# Patient Record
(demographics unavailable — no encounter records)

---

## 2017-06-09 NOTE — EMERGENCY ROOM REPORT
History of Present Illness


General


Chief Complaint:  Pain


Source:  Patient





Present Illness


HPI


This is a 50-year-old female who had recent orthoscopic knee surgery to repair 

and left meniscal tear 2 weeks ago.  She was doing well and off of her pain 

medication.  Tonight her knee locked up and she tried to get up and she twisted 

her knee.  Complaining of left knee pain.  Pain is 9/10.  Worse with movement.  

Denies any fall.  Denies any nausea vomiting.  No radiation.


Allergies:  


Coded Allergies:  


     MEPERIDINE (Verified  Allergy, Intermediate, Hives, 2/25/13)


 COPIED FROM UNCODED SECTION


     MORPHINE (Verified  Allergy, ITCHING, 2/25/13)


     METOCLOPRAMIDE (Verified  Adverse Reaction, Intermediate, "MY MUSCLE 

MUSCLE ARE FREEZING UP", 2/25/13)


 COPIED FROM UNCODED SECTION





Patient History


Past Medical History:  see triage record, old chart reviewed


Past Surgical History:  other


Pertinent Family History:  none


Social History:  Denies: smoking


Pregnant Now:  No


Immunizations:  other


Reviewed Nursing Documentation:  PMH: Agreed, PSxH: Agreed





Nursing Documentation-PMH


Hx Cardiac Problems:  No - LEFT KNEE SURGERY


Hx Cancer:  No


Hx Gastrointestinal Problems:  Yes - GASTRIC ULCER,HAD PARTIAL GASTRECTOMY X2


Hx Neurological Problems:  Yes


Hx Cerebrovascular Accident:  No


Hx Transient Ischemic Attacks:  No


Hx Dementia:  No


Hx Alzheimer's Disease:  No


Hx Parkinson's Disease:  No


Hx Meningitis:  No


Hx Encephalitis:  No


Hx Seizures:  Yes - 2010


Hx Epilepsy:  No


Hx Multiple Sclerosis:  No


Hx Cerebral Palsy:  No


Hx Amyotrophic Lat Sclerosis:  No


Hx Guillian-[place] Syndrome:  No


Hx Paralysis:  No


Hx Peripheral Neuropathy:  No


Hx Spinal Cord Injury:  No


Hx Head Trauma:  No


Hx Traumatic Brain Injury:  No


Hx Memory Loss:  No


Hx Concentration Difficulty:  No


Hx Speech Problem:  No


Hx Tremors:  No


Hx Vertigo:  No


Hx Dizziness:  No


Hx Syncope:  No


Hx Headaches:  Yes - POST SUBDURAL HEMATOMA REMOVAL


Hx Aphasia:  No


Hx Dysphasia:  No


Hx Numbness:  No


Hx Weakness:  No


Hx Fatigue:  No


Hx Neurologic Surgery:  Yes - HAD SUBDURAL HEMATOMA REMOVED


Hx Brain Shunt:  No





Review of Systems


Eye:  Denies: blurred vision, eye pain


ENT:  Denies: ear pain, nose congestion, throat swelling


Respiratory:  Denies: cough, shortness of breath


Cardiovascular:  Denies: chest pain, palpitations


Gastrointestinal:  Denies: abdominal pain, diarrhea, nausea, vomiting


Musculoskeletal:  Reports: joint pain, joint swelling, Denies: back pain


Skin:  Denies: rash


Neurological:  Denies: headache, numbness


Endocrine:  Denies: increased thirst, increased urine


Hematologic/Lymphatic:  Denies: easy bruising


All Other Systems:  negative except mentioned in HPI





Physical Exam





Vital Signs








  Date Time  Temp Pulse Resp B/P Pulse Ox O2 Delivery O2 Flow Rate FiO2


 


6/8/17 23:42 97.9 91 16 112/69 95 Room Air  





vitals normal


Sp02 EP Interpretation:  reviewed, normal


General Appearance:  well appearing, no apparent distress, alert


Head:  normocephalic, atraumatic


Eyes:  bilateral eye EOMI, bilateral eye PERRL


ENT:  hearing grossly normal, normal pharynx


Neck:  full range of motion, supple, no meningismus


Respiratory:  chest non-tender, lungs clear, normal breath sounds


Cardiovascular #1:  regular rate, rhythm, no murmur


Gastrointestinal:  normal bowel sounds, non tender, no mass, no organomegaly, 

no bruit, non-distended


Musculoskeletal:  back normal, other - Patient has tenderness along the medial 

meniscus.  Knee is stable.


Psychiatric:  mood/affect normal


Skin:  warm/dry





Medical Decision Making


Diagnostic Impression:  


 Primary Impression:  


 Left knee sprain


 Qualified Codes:  S83.412A - Sprain of medial collateral ligament of left knee

, initial encounter


ER Course


She presents with left knee sprain.  She said she has knee immobilizer and 

crutches at home.  Did not require new one.  No evidence of any bony injuries.  

She may have ligament injury.  She may be an MRI.  This can be done as an 

outpatient.


Other X-Ray Diagnostic Results


Other X-Ray Diagnostic Results :  


   X-Ray Ordered:  X-rays left knee


   Date:  Jun 9, 2017


   Time:  00:23


   EP Interpretation:  Yes


   Findings:  no fractures, no dislocation, no soft tissue swelling


   Number of Views:  3





Last Vital Signs








  Date Time  Temp Pulse Resp B/P Pulse Ox O2 Delivery O2 Flow Rate FiO2


 


6/8/17 23:56 97.9 79 16 112/69 95 Room Air  








Status:  improved


Disposition:  HOME, SELF-CARE


Condition:  Stable


Scripts


Hydrocodone/Acetaminophen 5-325* (HYDROCODONE/ACETAMINOPHEN 5-325*) 1 Each 

Tablet


1 TAB ORAL Q6H Y for For Pain, #20 TAB 0 Refills


   Prov: DREW ESCOBEDO M.D.         6/9/17


Referrals:  


[facility]OS[facility],REFERRING (PCP)





Additional Instructions:  


Followup with the orthopedic Dr. in 3-5 days.  Ice pack to the area.  Use the 

knee immobilizer and crutches.  Return if worse.











DREW ESCOBEDO M.D. Jun 9, 2017 00:24

## 2017-06-09 NOTE — DIAGNOSTIC IMAGING REPORT
Indication: PAIN



Technique: 3 views of the left knee



Comparison: None



Findings:No suprapatellar effusion. No acute fracture. No dislocation. There is a

small superior pole patellar osteophyte.



Impression:No acute process



This agrees with the preliminary interpretation provided by the emergency room

physician

## 2017-07-08 NOTE — EMERGENCY ROOM REPORT
History of Present Illness


General


Chief Complaint:  Upper Extremity Injury


Source:  Patient, Medical Record





Present Illness


HPI


The patient is a 50-year-old female presenting for left elbow and left knee 

pain after falling.  Patient states that she had meniscus repair surgery one 

month prior.  She states that she was climbing stairs, tripped, and fell onto 

the left elbow 3 days prior.  She is now complaining of both he and elbow pain.

  Described as a 10 out of 10 dull ache and does not radiate from these areas.  

Worse with touch. 


She denies any other symptoms including numbness or tingling.


Allergies:  


Coded Allergies:  


     MEPERIDINE (Verified  Allergy, Intermediate, Hives, 13)


 COPIED FROM UNCODED SECTION


     MORPHINE (Verified  Allergy, ITCHING, 13)


     METOCLOPRAMIDE (Verified  Adverse Reaction, Intermediate, "MY MUSCLE 

MUSCLE ARE FREEZING UP", 13)


 COPIED FROM UNCODED SECTION





Patient History


Past Medical History:  see triage record


Pertinent Family History:  none


Last Menstrual Period:  1 year ago


Pregnant Now:  No


:  2


Para:  0


Reviewed Nursing Documentation:  PMH: Agreed, PSxH: Agreed





Nursing Documentation-PMH


Past Medical History:  No History, Except For


Hx Cardiac Problems:  No - LEFT KNEE SURGERY


Hx Cancer:  No


Hx Gastrointestinal Problems:  Yes - GASTRIC ULCER,HAD PARTIAL GASTRECTOMY X2


Hx Neurological Problems:  Yes


Hx Cerebrovascular Accident:  No


Hx Transient Ischemic Attacks:  No


Hx Dementia:  No


Hx Alzheimer's Disease:  No


Hx Parkinson's Disease:  No


Hx Meningitis:  No


Hx Encephalitis:  No


Hx Seizures:  Yes - 


Hx Epilepsy:  No


Hx Multiple Sclerosis:  No


Hx Cerebral Palsy:  No


Hx Amyotrophic Lat Sclerosis:  No


Hx Guillian-[place] Syndrome:  No


Hx Paralysis:  No


Hx Peripheral Neuropathy:  No


Hx Spinal Cord Injury:  No


Hx Head Trauma:  No


Hx Traumatic Brain Injury:  No


Hx Memory Loss:  No


Hx Concentration Difficulty:  No


Hx Speech Problem:  No


Hx Tremors:  No


Hx Vertigo:  No


Hx Dizziness:  No


Hx Syncope:  No


Hx Headaches:  Yes - POST SUBDURAL HEMATOMA REMOVAL


Hx Aphasia:  No


Hx Dysphasia:  No


Hx Numbness:  No


Hx Weakness:  No


Hx Fatigue:  No


Hx Neurologic Surgery:  Yes - HAD SUBDURAL HEMATOMA REMOVED


Hx Brain Shunt:  No





Review of Systems


All Other Systems:  negative except mentioned in HPI





Physical Exam





Vital Signs








  Date Time  Temp Pulse Resp B/P Pulse Ox O2 Delivery O2 Flow Rate FiO2


 


17 14:06 98.2 80 18 109/75 96 Room Air  








Sp02 EP Interpretation:  reviewed, normal


General Appearance:  no apparent distress, alert, GCS 15, non-toxic


Head:  normocephalic, atraumatic


Eyes:  bilateral eye PERRL, bilateral eye normal inspection


ENT:  hearing grossly normal, normal pharynx, no angioedema, normal voice


Musculoskeletal:  back normal, gait/station normal, normal range of motion, 

tender - L olecranon, L anterior knee


Neurologic:  alert, oriented x3, responsive, motor strength/tone normal, 

sensory intact, speech normal


Psychiatric:  judgement/insight normal, memory normal, mood/affect normal, no 

suicidal/homicidal ideation


Skin:  normal color, no rash, warm/dry, well hydrated





Procedures


Splinting


Splinting :  


   Consent:  Verbal


   Location:  L arm


   Pre-Made Type:  sling


   Pre-Proc Neuro Vasc Exam:  normal


   Post-Proc Neuro Vasc Exam:  normal


   Patient Tolerated:  Well


   Complications:  None





Medical Decision Making


PA Attestation


Dr. Murillo is my supervising physician. Patient management was discussed 

with my supervising physician


Diagnostic Impression:  


 Primary Impression:  


 Left knee sprain


 Qualified Codes:  S83.92XA - Sprain of unspecified site of left knee, initial 

encounter


 Additional Impression:  


 Elbow contusion


 Qualified Codes:  S50.02XA - Contusion of left elbow, initial encounter


ER Course


The patient is a 50-year-old female presenting for left elbow and left knee 

pain after falling





Ddx considered include but not limited to sprain/strain, fracture, contusion





PE: NAD


TTP over the L olecranon and  L anterior knee without deformity. No ecchymosis. 

No edema. FulL AROM





X-ray of both areas are unremarkable.





Left arm sling is placed





She'll be discharged home with a prescription for pain medication and needs to 

followup with orthopedics as she had recent surgery on the left knee.





ER precautions given


Other X-Ray Diagnostic Results


Other X-Ray Diagnostic Results #1:  


   X-Ray ordered:  L knee


   Indication:  Pain


   EP Interpretation:  Yes


   Interpretation:  no dislocation, no soft tissue swelling, no fractures


   Impression:  No acute disease


   Interpreting ER Provider:  Dr. Murillo


   PA Scribe Text


I am acting as scribe for my supervising physician. My supervising physician's 

interpretation of the L knee xrays are there are no fractures, dislocations or 

soft tissue swelling.


Other X-Ray Diagnostic Results #2:  


   X-Ray ordered:  L elbow


   # of Views/Limited Vs Complete:  3 View


   Indication:  Pain


   EP Interpretation:  Yes


   Interpretation:  no dislocation, no soft tissue swelling, no fractures


   Impression:  No acute disease


   Interpreting ER Provider:  Dr. Lidia RENTERIA Scribe Text


I am acting as scribe for my supervising physician. My supervising physician's 

interpretation of the L knee xrays are there are no fractures, dislocations or 

soft tissue swelling.





Last Vital Signs








  Date Time  Temp Pulse Resp B/P Pulse Ox O2 Delivery O2 Flow Rate FiO2


 


17 15:09 98.2  18 109/75 96 Room Air  


 


17 14:06  80      








Status:  improved


Disposition:  HOME, SELF-CARE


Condition:  Improved


Scripts


Hydrocodone Bit/Acetaminophen 5-325* (NORCO 5-325 TABLET*) 1 Each Tablet


1 TAB ORAL Q6HR Y for For Pain, #10 TAB


   Prov: NADEEN LOREDO P.A.         17 


Ibuprofen* (MOTRIN*) 600 Mg Tablet


600 MG ORAL Q8H Y for For Pain, #30 TAB 0 Refills


   Prov: NADEEN LOREDO P.A.         17


Patient Instructions:  Elbow Contusion, Knee Sprain





Additional Instructions:  


I discussed my findings with the patient. All questions and concerns have been 

answered. Treatment and medication compliance have been addressed. I advised 

the patient that they need to follow up with PMD in 3-5 days. Return to ED if 

pain remains or worsens, numbness or tingling occurs, new rash is noticed, 

fever is noticed, or if needed for any reason. Patient verbalized understanding 

of discharge instructions.





The patient is to follow up with orthopedic surgeon for reevaluation of the 

left knee











NADEEN LOREDO 2017 22:06

## 2017-07-09 NOTE — DIAGNOSTIC IMAGING REPORT
Indications:  Fall, left elbow injury and pain



Technique: 3 views left elbow.



Findings:



Comparison: None



No fracture, dislocation, joint space     widening or effusion , surrounding soft

tissue swelling/foreign body/gas, or other acute changes are identified.



IMPRESSION:



No evidence of acute injury to the left elbow.

## 2017-07-10 NOTE — DIAGNOSTIC IMAGING REPORT
Indications:  Fall, left knee injury and pain



Technique: 3 views left knee.



Findings:



Comparison: None



No fracture, dislocation, joint space     widening or effusion , surrounding soft

tissue swelling/foreign body/gas, or other acute changes are identified. Small 

spur

emanates from the superior pole of the patella in region of quadriceps tendon

insertion.



IMPRESSION:



No evidence of acute injury to the left knee



Patellar enthesophyte.

## 2017-07-17 NOTE — DIAGNOSTIC IMAGING REPORT
Indication: Abdominal pain



Technique: Continuous helical transaxial imaging of the abdomen and pelvis was

obtained from the lung bases to the pubic symphysis during intravenous 

contrast

administration. Coronal 2-D reformats were also obtained. Study obtained in a

Siemens sensation 64 slice CT.



Total Dose length Product (DLP):  692 mGycm



CT Dose Index Volume (CTDIvol):   15 mGy



Comparison: None



Findings: There is mild posterior basilar atelectasis. Cholecystectomy noted. No

biliary ductal prominence is demonstrated. Please correlate clinically. Sutures in

the stomach are noted, likely stigmata of gastric bypass. No free fluid or free air

or evidence of bowel obstruction. No hydronephrosis demonstrated. Mild 

arterial

calcifications are present. The uterus is retroverted. The appendix is definitely

seen.



Impression:



Biliary ductal prominence in the context of previous cholecystectomy. Please

correlate clinically.



Status post gastric bypass.



Retroverted uterus



[facility] has communicated the preliminary results to the 

Emergency

Department.  Their findings are largely concordant with this report.







The CT scanner at [facility] is accredited by the [employer] College 

of

Radiology and the scans are performed using dose optimization techniques as

appropriate to a performed exam including Automatic Exposure control.

## 2017-07-17 NOTE — EMERGENCY ROOM REPORT
History of Present Illness


General


Chief Complaint:  Abdominal Pain


Source:  Patient





Present Illness


HPI


50-year-old female presents ED complaining of abdominal pain and vomiting.  

Patient states that she's been vomiting blood.  Notes history of gastric 

ulcers.  States she was recently admitted to [facility] for treatment of the 

ulcer and had it "clipped".  She states she was discharged a few days ago.  

Patient notes having multiple episodes of vomiting blood.  Pain is epigastric, 

sharp, 8/10, nonradiating.  Denies chest pain or shortness of breath.  Denies 

taking blood thinners.  No other aggravating relieving factors.  Denies any 

other associated symptoms


Allergies:  


Coded Allergies:  


     MEPERIDINE (Verified  Allergy, Intermediate, Hives, 2/25/13)


 COPIED FROM UNCODED SECTION


     PANTOPRAZOLE (Verified  Allergy, Intermediate, 7/16/17)


     MORPHINE (Verified  Allergy, Unknown, ITCHING, 7/16/17)


     METOCLOPRAMIDE (Verified  Adverse Reaction, Intermediate, "MY MUSCLE 

MUSCLE ARE FREEZING UP", 2/25/13)


 COPIED FROM UNCODED SECTION





Patient History


Past Medical History:  ulcer


Past Surgical History:  other - gastric bypass


Pertinent Family History:  none


Social History:  Denies: alcohol use, drug use, smoking


Pregnant Now:  No


Immunizations:  UTD


Reviewed Nursing Documentation:  PMH: Agreed, PSxH: Agreed





Nursing Documentation-PMH


Hx Cardiac Problems:  No


Hx Hypertension:  No


Hx Asthma:  No


Hx COPD:  No


Hx Diabetes:  No


Hx Cancer:  No


Hx Gastrointestinal Problems:  Yes - ulcer


Hx Dialysis:  No


History Of Psychiatric Problem:  No


Hx Neurological Problems:  No


Hx Cerebrovascular Accident:  No


Hx Transient Ischemic Attacks:  No


Hx Dementia:  No


Hx Alzheimer's Disease:  No


Hx Parkinson's Disease:  No


Hx Meningitis:  No


Hx Encephalitis:  No


Hx Seizures:  No


Hx Epilepsy:  No


Hx Multiple Sclerosis:  No


Hx Cerebral Palsy:  No


Hx Amyotrophic Lat Sclerosis:  No


Hx Guillian-[place] Syndrome:  No


Hx Paralysis:  No


Hx Peripheral Neuropathy:  No


Hx Spinal Cord Injury:  No


Hx Head Trauma:  No


Hx Traumatic Brain Injury:  No


Hx Memory Loss:  No


Hx Concentration Difficulty:  No


Hx Speech Problem:  No


Hx Tremors:  No


Hx Vertigo:  No


Hx Dizziness:  No


Hx Syncope:  No


Hx Headaches:  Yes - POST SUBDURAL HEMATOMA REMOVAL


Hx Aphasia:  No


Hx Dysphasia:  No


Hx Numbness:  No


Hx Weakness:  No


Hx Fatigue:  No


Hx Neurologic Surgery:  Yes - HAD SUBDURAL HEMATOMA REMOVED


Hx Brain Shunt:  No





Review of Systems


All Other Systems:  negative except mentioned in HPI





Physical Exam





Vital Signs








  Date Time  Temp Pulse Resp B/P Pulse Ox O2 Delivery O2 Flow Rate FiO2


 


7/16/17 21:44 98.2 68 16 108/60 98 Room Air  


 


7/16/17 22:36        98








Sp02 EP Interpretation:  reviewed, normal


General Appearance:  alert, GCS 15, non-toxic, mild distress, thin


Head:  normocephalic, atraumatic


Eyes:  bilateral eye PERRL, bilateral eye normal inspection


ENT:  hearing grossly normal, normal pharynx, no angioedema, normal voice


Neck:  full range of motion, supple/symm/no masses


Respiratory:  chest non-tender, lungs clear, normal breath sounds, speaking 

full sentences


Cardiovascular #1:  regular rate, rhythm, no edema


Cardiovascular #2:  2+ carotid (R), 2+ carotid (L), 2+ radial (R), 2+ radial (L)

, 2+ dorsalis pedis (R), 2+ dorsalis pedis (L)


Gastrointestinal:  normal bowel sounds, soft, non-distended, no guarding, no 

rebound, tenderness - epigastric


Rectal:  deferred


Genitourinary:  normal inspection, no CVA tenderness


Musculoskeletal:  back normal, gait/station normal, normal range of motion, non-

tender


Neurologic:  alert, oriented x3, responsive, motor strength/tone normal, 

sensory intact, speech normal


Psychiatric:  judgement/insight normal, memory normal, mood/affect normal, no 

suicidal/homicidal ideation


Reflexes:  3+ bicep (R), 3+ bicep (L), 3+ tricep (R), 3+ tricep (L), 3+ knee (R)

, 3+ knee (L)


Skin:  normal color, no rash, warm/dry, well hydrated


Lymphatic:  no adenopathy





Medical Decision Making


Diagnostic Impression:  


 Primary Impression:  


 UGIB (upper gastrointestinal bleed)


ER Course


Hospital Course 


50-year-old female presents ED complaining of vomiting blood.  History of 

ulcer.  Recently discharged from [facility]





Differential diagnoses include: UGIB, LGIB, hemorrhoids 





Clinical course


Patient placed on stretcher.  Cardiac monitor.  After initial history and 

physical I ordered labs, IV fluids, zofran, pepcid, pain meds, CT





Labs - no leukocytosis, Hb/Hct stable. electrolytes ok. trop negative


CT shows status post cholecystectomy, status post gastric bypass but no acute 

changes otherwise





She continues to have pain and is vomiting blood.  Started on octreotide





Case discussed with Dr. Crump and he agreed to accept the patient to his 

service for further care and support 





I feel this is a highly complex case requiring extensive working including EKG/

Rhythm strip, Xray/CT/US, Blood/urine lab work, repeat exams while in ED, and 

administration of strong opiates/narcotics for pain control, admission to 

hospital or close patient follow up.  





Diagnosis - UGIB





Patient admitted to [facility] in serious condition





Labs








Test


  7/16/17


22:20 7/16/17


23:40


 


White Blood Count


  6.5 K/UL


(4.8-10.8) 


 


 


Red Blood Count


  3.53 M/UL


(4.20-5.40) 


 


 


Hemoglobin


  11.2 G/DL


(12.0-16.0) 


 


 


Hematocrit


  32.9 %


(37.0-47.0) 


 


 


Mean Corpuscular Volume 93 FL (80-99)  


 


Mean Corpuscular Hemoglobin


  31.8 PG


(27.0-31.0) 


 


 


Mean Corpuscular Hemoglobin


Concent 34.2 G/DL


(32.0-36.0) 


 


 


Red Cell Distribution Width


  14.2 %


(11.6-14.8) 


 


 


Platelet Count


  149 K/UL


(150-450) 


 


 


Mean Platelet Volume


  8.8 FL


(6.5-10.1) 


 


 


Neutrophils (%) (Auto)


  70.8 %


(45.0-75.0) 


 


 


Lymphocytes (%) (Auto)


  21.4 %


(20.0-45.0) 


 


 


Monocytes (%) (Auto)


  5.4 %


(1.0-10.0) 


 


 


Eosinophils (%) (Auto)


  1.3 %


(0.0-3.0) 


 


 


Basophils (%) (Auto)


  1.1 %


(0.0-2.0) 


 


 


Prothrombin Time


  10.1 SEC


(9.30-11.50) 


 


 


Prothromb Time International


Ratio 1.0 (0.9-1.1) 


  


 


 


Activated Partial


Thromboplast Time 21 SEC (23-33) 


  


 


 


Sodium Level


  143 mEQ/L


(135-145) 


 


 


Potassium Level


  3.6 mEQ/L


(3.4-4.9) 


 


 


Chloride Level


  107 mEQ/L


() 


 


 


Carbon Dioxide Level


  24 mEQ/L


(20-30) 


 


 


Anion Gap 12 (5-15)  


 


Blood Urea Nitrogen


  18 mg/dL


(7-23) 


 


 


Creatinine


  0.6 mg/dL


(0.5-0.9) 


 


 


Estimat Glomerular Filtration


Rate > 60 mL/min


(>60) 


 


 


Glucose Level


  96 mg/dL


() 


 


 


Calcium Level


  9.1 mg/dL


(8.6-10.2) 


 


 


Total Bilirubin


  < 0.2 mg/dL


(0.0-1.2) 


 


 


Aspartate Amino Transf


(AST/SGOT) 17 U/L (5-40) 


  


 


 


Alanine Aminotransferase


(ALT/SGPT) 16 U/L (3-33) 


  


 


 


Alkaline Phosphatase


  62 U/L


() 


 


 


Troponin I


  < 0.30 ng/mL


(<=0.30) 


 


 


Total Protein


  6.1 g/dL


(6.6-8.7) 


 


 


Albumin


  3.9 g/dL


(3.5-5.2) 


 


 


Globulin 2.2 g/dL  


 


Albumin/Globulin Ratio 1.7 (1.0-2.7)  


 


Lipase 25 U/L (< 60)  


 


Urine Color  Pale yellow 


 


Urine Appearance  Clear 


 


Urine pH  7 (4.5-8.0) 


 


Urine Specific Gravity


  


  1.010


(1.005-1.035)


 


Urine Protein


  


  Negative


(NEGATIVE)


 


Urine Glucose (UA)


  


  Negative


(NEGATIVE)


 


Urine Ketones  2+ (NEGATIVE) 


 


Urine Occult Blood


  


  Negative


(NEGATIVE)


 


Urine Nitrite


  


  Negative


(NEGATIVE)


 


Urine Bilirubin


  


  Negative


(NEGATIVE)


 


Urine Urobilinogen


  


  Normal MG/DL


(0.0-1.0)


 


Urine Leukocyte Esterase


  


  Negative


(NEGATIVE)








CT/MRI/US Diagnostic Results


CT/MRI/US Diagnostic Results :  


   Imaging Test Ordered:  CT A/P


   Impression


s/p cholecystectomy. s/p gastric bypass. no acute process identifed





Last Vital Signs








  Date Time  Temp Pulse Resp B/P Pulse Ox O2 Delivery O2 Flow Rate FiO2


 


7/16/17 22:36 98.0 78 16 135/76 98 Room Air  98








Status:  improved


Disposition:  ADMITTED AS INPATIENT


Condition:  Serious


Referrals:  


[facility],REFERRING (PCP)











BELTRAN BERNAL M.D. Jul 17, 2017 01:30

## 2017-07-18 NOTE — 48 HOUR POST ANESTHESIA EVAL
Post Anesthesia Evaluation


Procedure:  EGD


Date of Evaluation:  Jul 18, 2017


Time of Evaluation:  19:43


Blood Pressure Systolic:  114


0:  70


Pulse Rate:  56


Respiratory Rate:  20


Temperature (Fahrenheit):  98.5


O2 Sat by Pulse Oximetry:  98


Airway:  patent


Nausea:  No


Vomiting:  No


Pain Intensity:  0


Hydration Status:  adequate


Cardiopulmonary Status:


at baseline


Mental Status/LOC:  patient returned to baseline


Post-Anesthesia Complications:


0


Follow-up care needed:  N/A - further care as per primary team











LESVIA LAY M.D. Jul 18, 2017 13:18

## 2017-07-18 NOTE — HISTORY AND PHYSICAL REPORT
DATE OF ADMISSION:  07/17/2017



CHIEF COMPLAINT:  Abdominal pain.



HISTORY OF PRESENT ILLNESS:  This is a 50-year-old female who was

just discharged from [facility] two days ago after

admission for GI bleed.  The patient was admitted there to the [facility]

service.  The patient underwent there full workup.  She was seen there by

Dr. Valdivia, GI consult.  She underwent EGD with active oozing seen in the

jejunum.  This was a hemoclips.  Her hemoglobin remained stable post

procedure with no further episodes of hematemesis.  She had a trial of

Protonix.  The patient developed "body spasms."  This resolved with

Benadryl.  The patient was intolerant to PPIs and was started on Zantac

b.i.d.  She was continued on Carafate.  The patient's symptoms were

controlled with p.o. Langley.  The patient demanded to go home and was

discharged.  I tried to get the patient to my office, but the phone was

disconnected.  The patient was referred interestingly in this emergency

room yesterday.



PAST MEDICAL HISTORY:

1. Multiple admissions for gastrointestinal bleed.

2. Status post partial gastrectomy at [facility] in 2007.

3. Anastomotic AVMs.

4. Status post APC three months ago.

5. Degenerative joint disease.

6. Status post cholecystectomy.

7. Status post gastric bypass.

8. History of extrahepatic biliary ductal dilatation.

9. Decompressed rectosigmoid.

10. History of head injury.

11. History of anemia.

12. History of bleeding disorder.

13. History of subdural hematoma in 2010.

14. Status post appendectomy.

15. History of GI Argon plasma coagulation.

16. Status post hysterectomy.



MEDICATIONS:  Pre-admission, Norco p.r.n., Zofran p.r.n., Protonix,

and Carafate.



ALLERGIES:  Meperidine, morphine causing rash, and Reglan.



SOCIAL HISTORY:  She smokes one pack a day of cigarettes.



FAMILY HISTORY:  Unremarkable.



REVIEW OF SYSTEMS:  HEENT:  Normal.    Endocrine:  No history of

diabetes, thyroid, or adrenal problems.  Respiratory:  She denies

shortness of breath, cough, or hemoptysis.  Cardiovascular:  She denies

chest pain or palpitations.  Gastrointestinal:  No history of

hematochezia, melena, hematemesis, diarrhea, or constipation.

Genitourinary:  She denies dysuria, frequency, or hematuria.  Neurologic:

No history of stroke, syncope, or Parkinson disease.



PHYSICAL EXAMINATION:

GENERAL:  This is a middle-aged female, who is in no acute

distress.

VITAL SIGNS:  Blood pressure 100/60, pulse 60 and regular,

respiratory rate 20, and temperature 98 degrees.

HEENT:  The head is normocephalic and atraumatic.  Pupils are equal,

round, and reactive to light and accommodation consensually.

NECK:  Supple.  Trachea midline.  There was no lymphadenopathy or

thyromegaly.

LUNGS:  Clear to auscultation and percussion.

HEART:  Regular rate and rhythm without rubs, murmurs, or gallops.

ABDOMEN:  Soft and nontender.  Bowel sounds are active.

EXTREMITIES:  No clubbing, cyanosis, or edema.

NEUROLOGIC:  She is alert and oriented x4.  Cranial nerves II through

XII are intact.



LABORATORY AND ANCILLARY DATA:  CBC and BMP within normal limits.

Imaging reports, CT scan of abdomen shows biliary ductal prominence,

status post gastric bypass and retroverted uterus.



ASSESSMENT:

1. Nonspecific abdominal pain.  Plan is GI consult.

2. Multiple admissions for gastrointestinal bleed.

3. Status post partial gastrectomy at [facility] in 2007.

4. Anastomotic arteriovenous malformations.

5. Status post intermittent pneumatic compression three months ago.

6. Degenerative joint disease.

7. Status post cholecystectomy.

8. Status post gastric bypass.

9. History of extra hepatic biliary ductal dilatation.

10. Decompressed rectosigmoid.

11. History of head injury.

12. History of anemia.

13. History of bleeding disorder.

14. History of subdural hematoma in 2010.

15. Status post appendectomy.

16. History of GI Argon plasma coagulation.

17. Status post hysterectomy.



PLAN:

1. Symptomatic treatment.

2. GI to advice.









  ______________________________________________

  Delisa Holt M.D.





DR:  LUIS ANGEL

D:  07/17/2017 16:59

T:  07/18/2017 03:12

JOB#:  [id number]

CC:

## 2017-07-18 NOTE — ENDOSCOPY PROCEDURE NOTE
Endoscopy Procedure Note


Indication for Procedure:  gib


Procedures Performed:  EGD


Operative Findings/Diagnosis:  gastric polyp


Specimen:  yes


Pt Tolerated Procedure Well:  Yes


Estimated Blood Loss:  none


Anesthesiologist:  [place]


Anesthesia:  MAC


Implant(s) used?:  No


50 yrs or older w/o bx or poly:  Not Applicable


10yrs. F/U not recommended:  Not Applicable











MITCH NICKERSON Jul 18, 2017 12:30

## 2017-07-18 NOTE — PROCEDURE NOTE
DATE OF PROCEDURE:  07/18/2017



SURGEON:  Álvaro Marmolejo M.D.



PROCEDURE:  Upper endoscopy with biopsy.



ANESTHESIOLOGIST:  Lilli Chinchilla M.D.



INSTRUMENT:  Olympus adult flexible upper endoscope.



INDICATION:  Upper gastrointestinal bleeding.



REASON FOR PROCEDURE:  The procedure, risks, benefits, and possible

consequences, including hemorrhage, aspiration, perforation and infection,

and alternative treatments, were explained to the patient/legal guardian

by Dr. Álvaro Marmolejo and the patient/legal guardian understood and

accepted these risks.



PROCEDURE:  After informed consent was obtained and the patient was

adequately sedated, Olympus upper endoscope was advanced from the mouth,

into the esophagus, into the stomach and the patient had partial

antrectomy.  Then, scope was advanced to the jejunum.



There is no active upper gastrointestinal bleeding at this time.  There

was a small polyp in the gastric body, which was biopsied.  The patient

had evidence of small hiatal hernia.  We advanced the scope into the

jejunum where the prior clip was seen.  Clip was still holding.  There was

no active bleeding at this time.  No obvious ulceration.  No obvious

visible vessel.  The patient tolerated the procedure well without any

complication.



SUMMARY OF FINDINGS:

1. Small hiatal hernia.

2. Gastric polyps, status post biopsy.

3. History of partial antrectomy.

4. Clip seen in the jejunum without any active bleeding at this time.



RECOMMENDATIONS:  The patient had a stable hemoglobin and hematocrit

since admission, most probably the blood was seen most probably from prior

bleeding.  Plan to resume diet and monitor labs.  Follow up biopsy

results.  Possible discharge if the patient is stable.



I want to thank, Dr. Holt, for this kind referral.









  ______________________________________________

  Álvaro Marmolejo M.D.





DR:  MAUREEN

D:  07/18/2017 12:28

T:  07/18/2017 17:41

JOB#:  [id number]

CC:  Delias Holt M.D.; Fax#:  [phone]

## 2017-07-18 NOTE — IMMEDIATE POST-OP EVALUATION
Immediate Post-Op Evalulation


Immediate Post-Op Evalulation


Procedure:  EGD


Date of Evaluation:  Jul 18, 2017


IV Fluids:  300


Blood Products:  0


Estimated Blood Loss:  0


Urinary Output:  0


Blood Pressure Systolic:  110


Blood Pressure Diastolic:  79


Pulse Rate:  71


Respiratory Rate:  16


O2 Sat by Pulse Oximetry:  100


Temperature (Fahrenheit):  97.1


Pain Score (1-10):  0


Nausea:  No


Vomiting:  No


Complications


0


Patient Status:  awake, patent, none


Hydration Status:  adequate


Drug:  N//A











LESVIA LAY M.D. Jul 18, 2017 13:17

## 2017-07-18 NOTE — GENERAL PROGRESS NOTE
Subjective


Allergies:  


Coded Allergies:  


     MEPERIDINE (Verified  Allergy, Intermediate, Hives, 2/25/13)


 COPIED FROM UNCODED SECTION


     PANTOPRAZOLE (Verified  Allergy, Intermediate, 7/16/17)


     MORPHINE (Verified  Allergy, Unknown, ITCHING, 7/16/17)


     METOCLOPRAMIDE (Verified  Adverse Reaction, Intermediate, "MY MUSCLE 

MUSCLE ARE FREEZING UP", 2/25/13)


 COPIED FROM UNCODED SECTION


Subjective


Patient went for EGD. To review results .





Objective





Last 24 Hour Vital Signs








  Date Time  Temp Pulse Resp B/P Pulse Ox O2 Delivery O2 Flow Rate FiO2


 


7/18/17 11:45 97.9 86 19 122/74 96 Room Air  


 


7/18/17 11:36 97.9       


 


7/18/17 08:00  50      


 


7/18/17 08:00 97.9 57 20 118/75 96 Room Air  


 


7/18/17 04:38  50      


 


7/18/17 04:22 97.7 58 20 114/73 97 Room Air  





  58      


 


7/18/17 04:00  58      


 


7/18/17 00:00  58      


 


7/17/17 23:33 97.7 56 20 124/80 99 Room Air  





  56      


 


7/17/17 20:12 98.0 62 20 140/88 98 Room Air  





  62      


 


7/17/17 20:00  59      


 


7/17/17 16:00 98.2 72 18 110/73 97 Room Air  


 


7/17/17 15:51  58      

















Intake and Output  


 


 7/17/17 7/18/17





 19:00 07:00


 


Intake Total 350 ml 952.5 ml


 


Output Total  1 ml


 


Balance 350 ml 951.5 ml


 


  


 


Intake Oral  365 ml


 


IV Total 350 ml 587.5 ml


 


Emesis  1 ml


 


# Voids 2 








Laboratory Tests


7/17/17 17:45: 


White Blood Count 4.3L, Red Blood Count 3.63L, Hemoglobin 11.1L, Hematocrit 

33.8L, Mean Corpuscular Volume 93, Mean Corpuscular Hemoglobin 30.5, Mean 

Corpuscular Hemoglobin Concent 32.8, Red Cell Distribution Width 14.5, Platelet 

Count 135L, Mean Platelet Volume 8.3, Neutrophils (%) (Auto) 44.4L, Lymphocytes 

(%) (Auto) 43.4, Monocytes (%) (Auto) 6.2, Eosinophils (%) (Auto) 4.4H, 

Basophils (%) (Auto) 1.7


7/18/17 01:40: 


White Blood Count 4.0L, Red Blood Count 3.63L, Hemoglobin 11.2L, Hematocrit 

34.6L, Mean Corpuscular Volume 95, Mean Corpuscular Hemoglobin 31.0, Mean 

Corpuscular Hemoglobin Concent 32.5, Red Cell Distribution Width 14.4, Platelet 

Count 144L, Mean Platelet Volume 7.7, Neutrophils (%) (Auto) 40.9L, Lymphocytes 

(%) (Auto) 45.3H, Monocytes (%) (Auto) 7.1, Eosinophils (%) (Auto) 5.2H, 

Basophils (%) (Auto) 1.5


7/18/17 06:20: 


White Blood Count 3.8L, Red Blood Count 3.78L, Hemoglobin 11.4L, Hematocrit 

36.0L, Mean Corpuscular Volume 95, Mean Corpuscular Hemoglobin 30.1, Mean 

Corpuscular Hemoglobin Concent 31.6L, Red Cell Distribution Width 14.5, 

Platelet Count 137L, Mean Platelet Volume 8.5, Neutrophils (%) (Auto) 39.8L, 

Lymphocytes (%) (Auto) 46.4H, Monocytes (%) (Auto) 7.6, Eosinophils (%) (Auto) 

4.9H, Basophils (%) (Auto) 1.3, Prothrombin Time 10.7, Prothromb Time 

International Ratio 1.0, Activated Partial Thromboplast Time 26, Sodium Level 

140, Potassium Level 3.9, Chloride Level 104, Carbon Dioxide Level 26, Anion 

Gap 10, Blood Urea Nitrogen 6L, Creatinine 0.5, Estimat Glomerular Filtration 

Rate > 60, Glucose Level 85, Calcium Level 8.5L, Total Bilirubin 0.3, Aspartate 

Amino Transf (AST/SGOT) 27, Alanine Aminotransferase (ALT/SGPT) 20, Alkaline 

Phosphatase 51, Total Protein 5.5L, Albumin 3.5, Globulin 2.0, Albumin/Globulin 

Ratio 1.7


Height (Feet):  5


Height (Inches):  9.00


Weight (Pounds):  130











LOBO BHATIA Jul 18, 2017 12:50

## 2017-07-18 NOTE — CARDIOLOGY REPORT
--------------- APPROVED REPORT --------------





EKG Measurement

Heart [id number]

KS [id number]

[id number]

[id number]

[id number]





Sinus bradycardia

Possible Left atrial enlargement

Septal infarct, age undetermined

Abnormal ECG

## 2017-07-18 NOTE — ANETHESIA PREOPERATIVE EVAL
Anesthesia Pre-op PMH/ROS


General


Date of Evaluation:  Jul 18, 2017


Anesthesiologist:  [place]


ASA Score:  ASA 3


Mallampati Score


Class I : Soft palate, uvula, fauces, pillars visible


Class II: Soft palate, uvula, fauces visible


Class III: Soft palate, base of uvula visible


Class IV: Only hard plate visible


Mallampati Classification:  Class II


Surgeon:  Jaleel


Diagnosis:  GI bleed


Surgical Procedure:  EGD


Anesthesia History:  none


Family History:  no anesthesia problems


Allergies:  


Coded Allergies:  


     MEPERIDINE (Verified  Allergy, Intermediate, Hives, 2/25/13)


 COPIED FROM UNCODED SECTION


     PANTOPRAZOLE (Verified  Allergy, Intermediate, 7/16/17)


     MORPHINE (Verified  Allergy, Unknown, ITCHING, 7/16/17)


     METOCLOPRAMIDE (Verified  Adverse Reaction, Intermediate, "MY MUSCLE 

MUSCLE ARE FREEZING UP", 2/25/13)


 COPIED FROM UNCODED SECTION


Medications:  see eMAR





Past Medical History


Cardiovascular:  Denies: CAD, HTN, MI, arrhythmia, other, valve dz


Pulmonary:  Denies: COPD, KARAN, asthma, other


Gastrointestinal/Genitourinary:  Reports: GERD, 


   Denies: CRI, ESRD, other


Neurologic/Psychiatric:  Reports: other - h/o subdural hematoma, 


   Denies: CVA, TIA, dementia, depression/anxiety


Endocrine:  Denies: DM, hypothyroidism, other, steroids


HEENT:  Denies: [place] (L), [place] (R), cataract (L), cataract (R), glaucoma, other


Hematology/Immune:  Reports: anemia - acute on chronic, 


   Denies: DVT, bleeding disorder, other


Musculoskeletal/Integumentary:  Denies: DDD, DJD, OA, RA, edema, other


PSxH Narrative:


partial gastrectomy





Anesthesia Pre-op Phys. Exam


Physician Exam





Last Vital Signs








  Date Time  Temp Pulse Resp B/P Pulse Ox O2 Delivery O2 Flow Rate FiO2


 


7/18/17 12:50 97.9 59 18 110/75 98 Room Air  


 


7/18/17 12:37       3.0 


 


7/17/17 03:20        98








Constitutional:  NAD


Cardiovascular:  RRR


Respiratory:  CTA





Airway Exam


Mallampati Score:  Class II


MO:  full


ROM:  full


Teeth:  intact





Anesthesia Pre-op A/P


Labs





Hematology








Test


  7/17/17


17:45 7/18/17


01:40 7/18/17


06:20


 


White Blood Count


  4.3 K/UL


(4.8-10.8)  L 4.0 K/UL


(4.8-10.8)  L 3.8 K/UL


(4.8-10.8)  L


 


Red Blood Count


  3.63 M/UL


(4.20-5.40)  L 3.63 M/UL


(4.20-5.40)  L 3.78 M/UL


(4.20-5.40)  L


 


Hemoglobin


  11.1 G/DL


(12.0-16.0)  L 11.2 G/DL


(12.0-16.0)  L 11.4 G/DL


(12.0-16.0)  L


 


Hematocrit


  33.8 %


(37.0-47.0)  L 34.6 %


(37.0-47.0)  L 36.0 %


(37.0-47.0)  L


 


Mean Corpuscular Volume 93 FL (80-99)   95 FL (80-99)   95 FL (80-99)  


 


Mean Corpuscular Hemoglobin


  30.5 PG


(27.0-31.0) 31.0 PG


(27.0-31.0) 30.1 PG


(27.0-31.0)


 


Mean Corpuscular Hemoglobin


Concent 32.8 G/DL


(32.0-36.0) 32.5 G/DL


(32.0-36.0) 31.6 G/DL


(32.0-36.0)  L


 


Red Cell Distribution Width


  14.5 %


(11.6-14.8) 14.4 %


(11.6-14.8) 14.5 %


(11.6-14.8)


 


Platelet Count


  135 K/UL


(150-450)  L 144 K/UL


(150-450)  L 137 K/UL


(150-450)  L


 


Mean Platelet Volume


  8.3 FL


(6.5-10.1) 7.7 FL


(6.5-10.1) 8.5 FL


(6.5-10.1)


 


Neutrophils (%) (Auto)


  44.4 %


(45.0-75.0)  L 40.9 %


(45.0-75.0)  L 39.8 %


(45.0-75.0)  L


 


Lymphocytes (%) (Auto)


  43.4 %


(20.0-45.0) 45.3 %


(20.0-45.0)  H 46.4 %


(20.0-45.0)  H


 


Monocytes (%) (Auto)


  6.2 %


(1.0-10.0) 7.1 %


(1.0-10.0) 7.6 %


(1.0-10.0)


 


Eosinophils (%) (Auto)


  4.4 %


(0.0-3.0)  H 5.2 %


(0.0-3.0)  H 4.9 %


(0.0-3.0)  H


 


Basophils (%) (Auto)


  1.7 %


(0.0-2.0) 1.5 %


(0.0-2.0) 1.3 %


(0.0-2.0)








Coagulation








Test


  7/18/17


06:20


 


Prothrombin Time


  10.7 SEC


(9.30-11.50)


 


Prothromb Time International


Ratio 1.0 (0.9-1.1)  


 


 


Activated Partial


Thromboplast Time 26 SEC (23-33)


 








Chemistry








Test


  7/18/17


06:20


 


Sodium Level


  140 mEQ/L


(135-145)


 


Potassium Level


  3.9 mEQ/L


(3.4-4.9)


 


Chloride Level


  104 mEQ/L


()


 


Carbon Dioxide Level


  26 mEQ/L


(20-30)


 


Anion Gap 10 (5-15)  


 


Blood Urea Nitrogen


  6 mg/dL (7-23)


L


 


Creatinine


  0.5 mg/dL


(0.5-0.9)


 


Estimat Glomerular Filtration


Rate > 60 mL/min


(>60)


 


Glucose Level


  85 mg/dL


()


 


Calcium Level


  8.5 mg/dL


(8.6-10.2)  L


 


Total Bilirubin


  0.3 mg/dL


(0.0-1.2)


 


Aspartate Amino Transf


(AST/SGOT) 27 U/L (5-40)  


 


 


Alanine Aminotransferase


(ALT/SGPT) 20 U/L (3-33)  


 


 


Alkaline Phosphatase


  51 U/L


()


 


Total Protein


  5.5 g/dL


(6.6-8.7)  L


 


Albumin


  3.5 g/dL


(3.5-5.2)


 


Globulin 2.0 g/dL  


 


Albumin/Globulin Ratio 1.7 (1.0-2.7)  











Studies


Pre-op Studies:  EKG - sr





Risk Assessment & Plan


Assessment:


ASA II


Plan:


MAC


Status Change Before Surgery:  No





Pre-Antibiotics


Drug:  NMaverickA











LESVIA LAY M.D. Jul 18, 2017 13:17

## 2017-07-18 NOTE — PRE-PROCEDURE NOTE/ATTESTATION
Pre-Procedure Note/Attestation


Complete Prior to Procedure


Planned Procedure:  not applicable


Procedure Narrative:


egd





Indications for Procedure


Pre-Operative Diagnosis:


gib





Attestation


I attest that I discussed the nature of the procedure; its benefits; risks and 

complications; and alternatives (and the risks and benefits of such alternatives

), prior to the procedure, with the patient (or the patient's legal 

representative).





I attest that, if there was a reasonable possibility of needing a blood 

transfusion, the patient (or the patient's legal representative) was given the 

[facility] standardized written summary, pursuant 

to the Jamal Tabby Blood Safety Act ([place] Health and Safety Code # 1645, as 

amended).





I attest that I re-evaluated the patient just prior to the surgery and that 

there has been no change in the patient's H&P, except as documented below:











MITCH NICKERSON Jul 18, 2017 12:01

## 2017-07-19 NOTE — GENERAL PROGRESS NOTE
Assessment/Plan


Assessment/Plan





GI Findings:


1. Small hiatal hernia.


2. Gastric polyps, status post biopsy.


3. History of partial antrectomy.


4. Clip seen in the jejunum without any active bleeding at this time.


stable H&H








Stable for DC


Asking for narcotics. Told her to come to my office tomorrow.


VILMA ZaidiDunmore [facility] Physician advisor





Subjective


Allergies:  


Coded Allergies:  


     MEPERIDINE (Verified  Allergy, Intermediate, Hives, 2/25/13)


 COPIED FROM UNCODED SECTION


     PANTOPRAZOLE (Verified  Allergy, Intermediate, 7/16/17)


     MORPHINE (Verified  Allergy, Unknown, ITCHING, 7/16/17)


     METOCLOPRAMIDE (Verified  Adverse Reaction, Intermediate, "MY MUSCLE 

MUSCLE ARE FREEZING UP", 2/25/13)


 COPIED FROM UNCODED SECTION


Subjective


No new c/o. EGD + GI noted. No active bleeding.





Objective





Last 24 Hour Vital Signs








  Date Time  Temp Pulse Resp B/P Pulse Ox O2 Delivery O2 Flow Rate FiO2


 


7/19/17 08:00  61      


 


7/19/17 08:00 98.3 69 21 110/67 98 Room Air  


 


7/19/17 04:10 98.1       


 


7/19/17 04:00  63      


 


7/19/17 04:00 98.0 67 20 103/67 94 Room Air  


 


7/19/17 00:10  55      


 


7/19/17 00:00 98.1 65 18 98/64 97 Room Air  


 


7/18/17 20:00 98.5 66 20 100/67 97 Room Air  


 


7/18/17 19:44  56 20  98   


 


7/18/17 19:43  71 16  100   


 


7/18/17 19:39  64      


 


7/18/17 16:00  53      


 


7/18/17 16:00 98.5 56 20 114/70 98 Room Air  


 


7/18/17 12:50 97.9 59 18 110/75 98 Room Air  


 


7/18/17 12:49  47      


 


7/18/17 12:42  57 18 107/72 98 Room Air  


 


7/18/17 12:37  60 18 107/68 96 Nasal Cannula 3.0 


 


7/18/17 12:32 97.6 52 18 95/54 96 Nasal Cannula 3.0 

















Intake and Output  


 


 7/18/17 7/19/17





 19:00 07:00


 


Intake Total 800 ml 925 ml


 


Balance 800 ml 925 ml


 


  


 


Intake Oral  375 ml


 


IV Total 800 ml 550 ml


 


# Voids 2 2








Laboratory Tests


7/18/17 13:45: 


White Blood Count 5.0, Red Blood Count 4.07L, Hemoglobin 12.5, Hematocrit 39.2, 

Mean Corpuscular Volume 96, Mean Corpuscular Hemoglobin 30.7, Mean Corpuscular 

Hemoglobin Concent 31.9L, Red Cell Distribution Width 14.3, Platelet Count 136L

, Mean Platelet Volume 9.4, Neutrophils (%) (Auto) 49.6, Lymphocytes (%) (Auto) 

37.9, Monocytes (%) (Auto) 7.2, Eosinophils (%) (Auto) 4.2H, Basophils (%) (Auto

) 1.2


7/18/17 19:45: 


White Blood Count 5.8, Red Blood Count 3.41L, Hemoglobin 10.6L, Hematocrit 32.2L

, Mean Corpuscular Volume 94, Mean Corpuscular Hemoglobin 31.0, Mean 

Corpuscular Hemoglobin Concent 32.8, Red Cell Distribution Width 14.2, Platelet 

Count 139L, Mean Platelet Volume 7.4, Neutrophils (%) (Auto) 67.0, Lymphocytes (

%) (Auto) 23.4, Monocytes (%) (Auto) 5.3, Eosinophils (%) (Auto) 3.3H, 

Basophils (%) (Auto) 1.0


7/19/17 03:40: 


White Blood Count 6.7, Red Blood Count 3.63L, Hemoglobin 11.3L, Hematocrit 34.5L

, Mean Corpuscular Volume 95, Mean Corpuscular Hemoglobin 31.1H, Mean 

Corpuscular Hemoglobin Concent 32.7, Red Cell Distribution Width 14.0, Platelet 

Count 135L, Mean Platelet Volume 8.9, Neutrophils (%) (Auto) 75.4H, Lymphocytes 

(%) (Auto) 17.9L, Monocytes (%) (Auto) 4.7, Eosinophils (%) (Auto) 1.5, 

Basophils (%) (Auto) 0.6, Sodium Level 143, Potassium Level 4.3, Chloride Level 

104, Carbon Dioxide Level 26, Anion Gap 13, Blood Urea Nitrogen 10, Creatinine 

0.5, Estimat Glomerular Filtration Rate > 60, Glucose Level 71L, Calcium Level 

8.3L, Helicobacter pylori IgG Antibody [Pending]


Height (Feet):  5


Height (Inches):  9.00


Weight (Pounds):  130


Objective


CV RR


Lungs CTA


Abd SNT. BS +


E No CCE











LOBO BHATIA Jul 19, 2017 12:19

## 2017-07-19 NOTE — GI PROGRESS NOTE
Assessment/Plan


Problems:  


(1) UGIB (upper gastrointestinal bleed)


ICD Codes:  K92.2 - Gastrointestinal hemorrhage, unspecified


SNOMED:  42482650


(2) GIB (gastrointestinal bleeding)


ICD Codes:  K92.2 - Gastrointestinal hemorrhage, unspecified


SNOMED:  92541768


(3) Anastomotic ulcer


ICD Codes:  K28.9 - Anastomotic ulcer


SNOMED:  39900021


Status:  unchanged


Status Narrative


Discussed with Dr. Marmolejo.


Assessment/Plan


SUMMARY OF FINDINGS:


1. Small hiatal hernia.


2. Gastric polyps, status post biopsy.


3. History of partial antrectomy.


4. Clip seen in the jejunum without any active bleeding at this time.


stable H&H





RECOMMENDATIONS:  


ok for dc per GI standpoint once pain and nausea controlled


resume diet


zofran prn


prn transfusion


pain mgmt


fu bx for H. Pylori


fu labs





Subjective


Subjective


abdominal pain 10/10


nausea only





Objective





Last 24 Hour Vital Signs








  Date Time  Temp Pulse Resp B/P Pulse Ox O2 Delivery O2 Flow Rate FiO2


 


7/19/17 08:00  61      


 


7/19/17 08:00 98.3 69 21 110/67 98 Room Air  


 


7/19/17 04:10 98.1       


 


7/19/17 04:00  63      


 


7/19/17 04:00 98.0 67 20 103/67 94 Room Air  


 


7/19/17 00:10  55      


 


7/19/17 00:00 98.1 65 18 98/64 97 Room Air  


 


7/18/17 20:00 98.5 66 20 100/67 97 Room Air  


 


7/18/17 19:44  56 20  98   


 


7/18/17 19:43  71 16  100   


 


7/18/17 19:39  64      


 


7/18/17 16:00  53      


 


7/18/17 16:00 98.5 56 20 114/70 98 Room Air  


 


7/18/17 12:50 97.9 59 18 110/75 98 Room Air  


 


7/18/17 12:49  47      


 


7/18/17 12:42  57 18 107/72 98 Room Air  


 


7/18/17 12:37  60 18 107/68 96 Nasal Cannula 3.0 


 


7/18/17 12:32 97.6 52 18 95/54 96 Nasal Cannula 3.0 


 


7/18/17 11:45 97.9 86 19 122/74 96 Room Air  

















Intake and Output  


 


 7/18/17 7/19/17





 19:00 07:00


 


Intake Total 800 ml 925 ml


 


Balance 800 ml 925 ml


 


  


 


Intake Oral  375 ml


 


IV Total 800 ml 550 ml


 


# Voids 2 2











Laboratory Tests








Test


  7/18/17


13:45 7/18/17


19:45 7/19/17


03:40


 


White Blood Count


  5.0 K/UL


(4.8-10.8) 5.8 K/UL


(4.8-10.8) 6.7 K/UL


(4.8-10.8)


 


Red Blood Count


  4.07 M/UL


(4.20-5.40)  L 3.41 M/UL


(4.20-5.40)  L 3.63 M/UL


(4.20-5.40)  L


 


Hemoglobin


  12.5 G/DL


(12.0-16.0) 10.6 G/DL


(12.0-16.0)  L 11.3 G/DL


(12.0-16.0)  L


 


Hematocrit


  39.2 %


(37.0-47.0) 32.2 %


(37.0-47.0)  L 34.5 %


(37.0-47.0)  L


 


Mean Corpuscular Volume 96 FL (80-99)   94 FL (80-99)   95 FL (80-99)  


 


Mean Corpuscular Hemoglobin


  30.7 PG


(27.0-31.0) 31.0 PG


(27.0-31.0) 31.1 PG


(27.0-31.0)  H


 


Mean Corpuscular Hemoglobin


Concent 31.9 G/DL


(32.0-36.0)  L 32.8 G/DL


(32.0-36.0) 32.7 G/DL


(32.0-36.0)


 


Red Cell Distribution Width


  14.3 %


(11.6-14.8) 14.2 %


(11.6-14.8) 14.0 %


(11.6-14.8)


 


Platelet Count


  136 K/UL


(150-450)  L 139 K/UL


(150-450)  L 135 K/UL


(150-450)  L


 


Mean Platelet Volume


  9.4 FL


(6.5-10.1) 7.4 FL


(6.5-10.1) 8.9 FL


(6.5-10.1)


 


Neutrophils (%) (Auto)


  49.6 %


(45.0-75.0) 67.0 %


(45.0-75.0) 75.4 %


(45.0-75.0)  H


 


Lymphocytes (%) (Auto)


  37.9 %


(20.0-45.0) 23.4 %


(20.0-45.0) 17.9 %


(20.0-45.0)  L


 


Monocytes (%) (Auto)


  7.2 %


(1.0-10.0) 5.3 %


(1.0-10.0) 4.7 %


(1.0-10.0)


 


Eosinophils (%) (Auto)


  4.2 %


(0.0-3.0)  H 3.3 %


(0.0-3.0)  H 1.5 %


(0.0-3.0)


 


Basophils (%) (Auto)


  1.2 %


(0.0-2.0) 1.0 %


(0.0-2.0) 0.6 %


(0.0-2.0)


 


Sodium Level


  


  


  143 mEQ/L


(135-145)


 


Potassium Level


  


  


  4.3 mEQ/L


(3.4-4.9)


 


Chloride Level


  


  


  104 mEQ/L


()


 


Carbon Dioxide Level


  


  


  26 mEQ/L


(20-30)


 


Anion Gap   13 (5-15)  


 


Blood Urea Nitrogen


  


  


  10 mg/dL


(7-23)


 


Creatinine


  


  


  0.5 mg/dL


(0.5-0.9)


 


Estimat Glomerular Filtration


Rate 


  


  > 60 mL/min


(>60)


 


Glucose Level


  


  


  71 mg/dL


()  L


 


Calcium Level


  


  


  8.3 mg/dL


(8.6-10.2)  L


 


Helicobacter pylori IgG


Antibody 


  


  Pending  


 








Height (Feet):  5


Height (Inches):  9.00


Weight (Pounds):  130


General Appearance:  no apparent distress, thin


Cardiovascular:  normal rate


Respiratory/Chest:  normal breath sounds, no respiratory distress


Abdominal Exam:  normal bowel sounds, non tender, soft


Extremities:  normal range of motion











Sonia Adam N.P. Jul 19, 2017 11:19

## 2017-07-21 NOTE — DISCHARGE SUMMARY
Discharge Summary


Hospital Course


Date of Admission


Jul 17, 2017 at 01:13


Date of Discharge


Jul 19, 2017 at 13:40


Admitting Diagnosis


upper gastrointestinal bleed


HPI


Sue Lucero is a 50 year old female who was admitted on Jul 17, 2017 at 01:13 

for Upper Gastrointestinal Bleed


Hospital Course


[id number]





Discharge


Discharge Disposition


Patient was discharged to Home (01)


Discharge Diagnoses:  











Yudi Dunaway NP Jul 21, 2017 08:02

## 2017-07-22 NOTE — DISCHARGE SUMMARY 2 SIG
DATE OF ADMISSION:  07/17/2017



DATE OF DISCHARGE:  07/19/2017



CONSULTANT:  Álvaro Marmolejo M.D.



BRIEF HOSPITAL COURSE:  The patient is a 50-year-old female, who was

just discharged from [facility] two days prior after

admission for GI bleed, presented here to [facility] for

complaints of abdominal pain and multiple episodes of hematemesis.  On

evaluation at ED, H and H was stable, however she continued to have pain

and was vomiting blood.  She was started on octreotide and was admitted to

[facility] upper GI bleed.  Dr. Marmolejo was consulted and on 07/18/2017, the

patient underwent EGD with findings of small hiatal hernia, gastric polyp,

partial antrectomy, and a clip seal IP was seen in the jejunum without any

active bleeding.  Diet was resumed. She was given Zofran p.r.n. CAT scan

of the abdomen showed previous cholecystectomy, status post gastric

bypass.  Hemoglobin and hematocrit had been stable.  The patient was then

discharged home and advised to follow up as an outpatient.



PROCEDURE DONE:  EGD with biopsy on 07/18/2017.



FINAL DIAGNOSES:

1. Abdominal pain with possible gastrointestinal bleed.

2. Status post esophagogastroduodenoscopy with findings of small hiatal

hernia, gastric polyps, partial antrectomy, and clipping the jejunum

without any active bleed.







  ______________________________________________

  Delisa Holt M.D.



I have been assigned to dictate discharge summary on this account and I

was not involved in the patient's management.



  ______________________________________________

  Yudi Dunaway N.P.





DR:  JL/as

D:  07/21/2017 08:01

T:  07/21/2017 23:57

JOB#:  [id number]

CC: